# Patient Record
Sex: FEMALE | Race: WHITE
[De-identification: names, ages, dates, MRNs, and addresses within clinical notes are randomized per-mention and may not be internally consistent; named-entity substitution may affect disease eponyms.]

---

## 2017-07-15 ENCOUNTER — HEALTH MAINTENANCE LETTER (OUTPATIENT)
Age: 42
End: 2017-07-15

## 2019-11-03 ENCOUNTER — HEALTH MAINTENANCE LETTER (OUTPATIENT)
Age: 44
End: 2019-11-03

## 2020-11-16 ENCOUNTER — HEALTH MAINTENANCE LETTER (OUTPATIENT)
Age: 45
End: 2020-11-16

## 2021-01-28 ENCOUNTER — IMMUNIZATION (OUTPATIENT)
Dept: NURSING | Facility: CLINIC | Age: 46
End: 2021-01-28
Payer: COMMERCIAL

## 2021-01-28 PROCEDURE — 0001A PR COVID VAC PFIZER DIL RECON 30 MCG/0.3 ML IM: CPT

## 2021-01-28 PROCEDURE — 91300 PR COVID VAC PFIZER DIL RECON 30 MCG/0.3 ML IM: CPT

## 2021-02-07 ENCOUNTER — HEALTH MAINTENANCE LETTER (OUTPATIENT)
Age: 46
End: 2021-02-07

## 2021-02-18 ENCOUNTER — IMMUNIZATION (OUTPATIENT)
Dept: NURSING | Facility: CLINIC | Age: 46
End: 2021-02-18
Attending: INTERNAL MEDICINE
Payer: COMMERCIAL

## 2021-02-18 PROCEDURE — 0002A PR COVID VAC PFIZER DIL RECON 30 MCG/0.3 ML IM: CPT

## 2021-02-18 PROCEDURE — 91300 PR COVID VAC PFIZER DIL RECON 30 MCG/0.3 ML IM: CPT

## 2021-09-18 ENCOUNTER — HEALTH MAINTENANCE LETTER (OUTPATIENT)
Age: 46
End: 2021-09-18

## 2022-01-08 ENCOUNTER — HEALTH MAINTENANCE LETTER (OUTPATIENT)
Age: 47
End: 2022-01-08

## 2022-03-05 ENCOUNTER — HEALTH MAINTENANCE LETTER (OUTPATIENT)
Age: 47
End: 2022-03-05

## 2022-11-19 ENCOUNTER — HEALTH MAINTENANCE LETTER (OUTPATIENT)
Age: 47
End: 2022-11-19

## 2023-02-17 ENCOUNTER — OFFICE VISIT (OUTPATIENT)
Dept: PLASTIC SURGERY | Facility: CLINIC | Age: 48
End: 2023-02-17
Payer: COMMERCIAL

## 2023-02-17 DIAGNOSIS — J34.89 NASAL OBSTRUCTION: Primary | ICD-10-CM

## 2023-02-17 NOTE — LETTER
2/17/2023       RE: Lara Villavicencio  [address] [facility] [address]     Dear Colleague,    Thank you for referring your patient, Lara Villavicencio, to the [facility] at [facility]. Please see a copy of my visit note below.    Facial Plastic and Reconstructive Surgery Consultation    Referring Provider:  Dr. Quin ROBLES       HPI:   I had the pleasure of seeing Lara Villavicencio today in clinic for consultation for nasal obstruction. Lara Villavicencio is a 48 year old female who describes nasal obstruction in the setting of feeling like her throat closes up. She has been evaluated previously and found to have a straight septum but also nasal valve collapse. The nasal obstruction is noted primarily when she exerts herself.  She also has trouble breathing at night.  If she supports her nostrils with pulling on her cheek she feels significant improvement in her breathing.    She also discusses interest in addressing appearance in terms of the profile and bulbosity of the tip.     Of note Lara has a significant past medical history of hypercoagulability.  She has factor V Leiden deficiency in addition to a prothrombin deficiency and another genetic mutation.  She has had a history of a right leg DVT in 2001 in addition to pulmonary emboli twice.  She also has had an IVC filter placed.    She also discusses other options that she would like addressed from a cosmetic perspective.  She would like to address her upper lip fullness in addition to her upper eyelids.       Review Of Systems  ROS: 10 point ROS neg other than the symptoms noted above in the HPI.    Patient Active Problem List   Diagnosis     Osteopenia     Factor V Leiden mutation (H)     Lower leg DVT (deep venous thrombosis) (H)     Endometriosis     Chronic anticoagulation     Unconjugated benign bilirubinemia     HYPERLIPIDEMIA LDL GOAL <130     Pulmonary embolism in pregnancy childbirth or puerperium      "Anxiety     Past Surgical History:   Procedure Laterality Date     C DEXA INTERPRETATION, AXIAL  2007    T score lumbar -1.5, femoral neck -0.9/-0.8     C DEXA INTERPRETATION, AXIAL  2010    T score lumbar -1.2 (inc 4.3%) femoral neck -1.6/-1.8 with BMD 0.784 (dec 1.4%)     HC ENDOMETRIAL BIOPSY W/O CERVICAL DILATION  3/2010    atrophic endometrium     HC ENDOMETRIAL CRYOABLATION W US GUIDE, INCLUDES ENDOMET CURRET  3/2010    \"Her Option\"     HC HYSTEROS W PERMANENT FALLOPAIN IMPLANT  3/2010    Essure placement     HC HYSTEROSALPINGOGRAM  2010    hysterosalpingogram showed tubal blockade s/p Essure     HC US ABDOMEN COMPLETE  2009    small amount of echogenic bile sludge in the gallbladder, no stones     HC US PELVIC NON-OB, COMPLETE  2010    normal     LAPAROSCOPY,LYSIS ADHESNS      endometriosis     SURGICAL HISTORY OF -   2001    inferior vena cava  filter     ZZC  DELIVERY ONLY  2001    with re-exploration for bleeding, placental abruption , pregnancy loss     Current Outpatient Medications   Medication Sig Dispense Refill     atorvastatin (LIPITOR) 10 MG tablet Take 1 tablet (10 mg) by mouth daily 90 tablet 1     calcium carbonate-vitamin D (CALTRATE 600+D) 600-400 MG-UNIT CHEW Take 1 chew tab by mouth daily 90 tablet 3     warfarin (COUMADIN) 5 MG tablet 7.5 mg on  and  and 5 mg all the other days or as directed by the anticoagulation clinic 135 tablet 3     warfarin (COUMADIN) 7.5 MG tablet Take 1 tab by mouth  and  or as directed by ACC 30 tablet 0     Rocephin [ceftriaxone]  Social History     Socioeconomic History     Marital status:      Spouse name: Ventura     Number of children: 1     Years of education: 16     Highest education level: Not on file   Occupational History     Occupation: hygenist     Employer: OTHER     Comment: [employer]     Employer: OTHER   Tobacco Use     Smoking status: Never     Smokeless tobacco: Never   Substance " and Sexual Activity     Alcohol use: Yes     Comment: 1-2 drinks per week     Drug use: No     Sexual activity: Yes     Partners: Male     Birth control/protection: Condom, Surgical     Comment: same partner since 1994, only partner, Essure 3/2010   Other Topics Concern      Service No     Blood Transfusions Yes     Comment: with C secction/placenta previa     Caffeine Concern No     Comment: 1 soda daily     Occupational Exposure Yes     Comment: dental  hygienist     Hobby Hazards No     Sleep Concern No     Stress Concern Yes     Comment: moderate      Weight Concern No     Special Diet No     Comment: one glass milk daily     Back Care No     Exercise No     Bike Helmet Yes     Seat Belt Yes     Self-Exams Yes   Social History Narrative    No plans for further pregancy due to previous outcome of pregancy loss and chance of recurrent problems with clotting disorder in pregnancy.    12/2005: Taking on line courses for BS in dental hygiene, graduated in May 2007.        Lives with  and adopted daughter, age 11.  Has a cat and two dogs.     Social Determinants of Health     Financial Resource Strain: Not on file   Food Insecurity: Not on file   Transportation Needs: Not on file   Physical Activity: Not on file   Stress: Not on file   Social Connections: Not on file   Intimate Partner Violence: Not on file   Housing Stability: Not on file     Family History   Problem Relation Age of Onset     Blood Disease Mother         Factor V leiden, asymptomatic     Blood Disease Sister         Factor V Leiden, asymptomatic     Blood Disease Father         Factor V Leiden and prothrombin mutation, asymptomatic     Blood Disease Paternal Uncle         prothrombin mutation, asymptomatic     C.A.D. Maternal Grandfather         onset 60's     Diabetes Maternal Grandfather         type 2     Lipids Maternal Grandfather      Lipids Mother      Lipids Maternal Aunt      Lipids Maternal Uncle      C.A.D. Maternal Uncle          CABG mid 50s     C.A.D. Maternal Aunt         CABG early 50s     Depression Mother      Depression Sister         suicide attempt     Alzheimer Disease Maternal Grandmother         onset age 79     Cancer Maternal Grandmother         bladder cancer     Cancer Paternal Grandmother         melanoma     Circulatory Paternal Grandmother         DVT     Circulatory Paternal Grandfather         DVT     Osteoporosis Paternal Grandfather         rib and vertebral fractures       PE:  Alert and Oriented, Answering Questions Appropriately  Atraumatic, Normocephalic, Face Symmetric  Skin: Laws 2  Facial Nerve Intact and facial movement symmetric  EOM's, PEERL  Nasal Exam: Significant cephalic malposed position of the lower lateral cartilages with pinching in the alar groove bilaterally.  Both internal and external nasal valve support with inspiration.  Caudal and modified Tram maneuver does lead to significant improvement in nasal breathing.  Septum is straight.  Inferior turbinate is larger on the right than the left.  Notable dorsal convexity.  Thick nasal skin.  No external Deformity, no mucopurulence or polyps, no masses  Chin: Normal   Lips/Teeth/Toungue/Gums: Lips intact, Normal Dentition, Occlusion intact, Oral mucosa intact, no lesions/ulcerations/masses, Tongue mobile    PROCEDURE: diagnostic nasal endoscopy  Indication: nasal obstruction  Performed by: Nini Spears MD      Nasal Endoscopy is performed to provide a more thorough evaluation of the nasal airway than seen on standard nasal speculum exam.  Findings are as follows:   Anteriorly and posteriorly the septum is straight. The right inferior turbinate is larger than the left. The perpendicular plate is straight. The nasal valve internally is collapsed bilaterally with the caudal edge of the upper lateral cartilage in contact with the septum bilaterally.       IMPRESSION:    Nasal obstruction  Nasal valve collapse      PLAN:    Lara DAVIS  Saud presents today for discussion of nasal obstruction.  Her nasal obstruction is primarily due to the cephalic malposition of her lower lateral cartilages and the lack of support to both her external and internal nasal valves.  We discussed what definitive treatment is for that which would be an open approach lateral crural repositioning with lateral crural strut grafts.  We discussed that this is an invasive procedure of the nose and the biggest risk factor after surgery is bleeding.    In order for the surgery to be safe for the patient she would have to go off of anticoagulation.  This would pose then a significant risk postoperatively.  Most patients are not very mobile and the week after surgery and often have 2 weeks where it takes time to get back to function.  We discussed that the nasal valve could be addressed in a more minimally invasive fashion where I think her breathing could be nicely supported without the increased bleeding or thrombotic risk.  I think she would benefit from lateral wall nasal implants that I discussed with her today.  These are the Latera implants.    We discussed the potential appearance changes she would want for her nose and those would involve osteotomies in addition to bony vault changes.  I do also think these pose a significant risk for her.  I have performed elective procedures on patients with prothrombotic conditions however within that spectrum with her multiple genetic mutations her past history of 2 pulmonary emboli and the DVT I think she falls within a higher risk category and thus the benefits of surgery have to significantly outweigh the risk.    She is interested in considering the lateral nasal wall implants.  I am we will work to obtain prior authorization for this.      Photodocumentation was obtained.     I spent a total of 45 minutes in the care of patient Lara Villavicencio during today's office visit. This time includes reviewing the patient's chart and  prior history, obtaining a history, performing an examination and evaluation and counseling the patient. This time also includes ordering mediations or tests necessary in addition to communication to other member's of the patient's health care team. Time spent in documentation and care coordination is included.       Sincerely,    Nini Spears MD

## 2023-02-17 NOTE — NURSING NOTE
2D/3D photodocumentation obtained.    Will work to obtain PA for Latera.    Gave pt a cosmetic quote for Bilateral Upper Eyelid Blepharoplasty (see Media tab).    Discussed quote in great detail with pt, including the fact that the Anesthesia and Facility fees are an estimate based on time and may be subject to change.    Pt given educational materials on what to expect post-op, as well as a list of medications to avoid prior to surgery.    Sabina Brooks RN  2/17/2023 11:01 AM

## 2023-02-17 NOTE — LETTER
February 22, 2023  Re: Lara Villavicencio  1975      Thank you so much for referring Lara Villavicencio to the [facility]. I had the pleasure of visiting with Lara today.     Attached you will find a copy of my note. Please feel free to reach out to me with any questions, [phone].     Facial Plastic and Reconstructive Surgery Consultation    Referring Provider:  Dr. Quin ROBLES       HPI:   I had the pleasure of seeing Lara Villavicencio today in clinic for consultation for nasal obstruction. Lara Villavicencio is a 48 year old female who describes nasal obstruction in the setting of feeling like her throat closes up. She has been evaluated previously and found to have a straight septum but also nasal valve collapse. The nasal obstruction is noted primarily when she exerts herself.  She also has trouble breathing at night.  If she supports her nostrils with pulling on her cheek she feels significant improvement in her breathing.    She also discusses interest in addressing appearance in terms of the profile and bulbosity of the tip.     Of note Lara has a significant past medical history of hypercoagulability.  She has factor V Leiden deficiency in addition to a prothrombin deficiency and another genetic mutation.  She has had a history of a right leg DVT in 2001 in addition to pulmonary emboli twice.  She also has had an IVC filter placed.    She also discusses other options that she would like addressed from a cosmetic perspective.  She would like to address her upper lip fullness in addition to her upper eyelids.       Review Of Systems  ROS: 10 point ROS neg other than the symptoms noted above in the HPI.    Patient Active Problem List   Diagnosis     Osteopenia     Factor V Leiden mutation (H)     Lower leg DVT (deep venous thrombosis) (H)     Endometriosis     Chronic anticoagulation     Unconjugated benign bilirubinemia     HYPERLIPIDEMIA LDL GOAL <130     Pulmonary embolism in pregnancy childbirth or  "puerperium     Anxiety     Past Surgical History:   Procedure Laterality Date     C DEXA INTERPRETATION, AXIAL  2007    T score lumbar -1.5, femoral neck -0.9/-0.8     C DEXA INTERPRETATION, AXIAL  2010    T score lumbar -1.2 (inc 4.3%) femoral neck -1.6/-1.8 with BMD 0.784 (dec 1.4%)     HC ENDOMETRIAL BIOPSY W/O CERVICAL DILATION  3/2010    atrophic endometrium     HC ENDOMETRIAL CRYOABLATION W US GUIDE, INCLUDES ENDOMET CURRET  3/2010    \"Her Option\"     HC HYSTEROS W PERMANENT FALLOPAIN IMPLANT  3/2010    Essure placement     HC HYSTEROSALPINGOGRAM  2010    hysterosalpingogram showed tubal blockade s/p Essure     HC US ABDOMEN COMPLETE  2009    small amount of echogenic bile sludge in the gallbladder, no stones     HC US PELVIC NON-OB, COMPLETE  2010    normal     LAPAROSCOPY,LYSIS ADHESNS      endometriosis     SURGICAL HISTORY OF -   2001    inferior vena cava  filter     ZZC  DELIVERY ONLY  2001    with re-exploration for bleeding, placental abruption , pregnancy loss     Current Outpatient Medications   Medication Sig Dispense Refill     atorvastatin (LIPITOR) 10 MG tablet Take 1 tablet (10 mg) by mouth daily 90 tablet 1     calcium carbonate-vitamin D (CALTRATE 600+D) 600-400 MG-UNIT CHEW Take 1 chew tab by mouth daily 90 tablet 3     warfarin (COUMADIN) 5 MG tablet 7.5 mg on  and  and 5 mg all the other days or as directed by the anticoagulation clinic 135 tablet 3     warfarin (COUMADIN) 7.5 MG tablet Take 1 tab by mouth  and  or as directed by ACC 30 tablet 0     Rocephin [ceftriaxone]  Social History     Socioeconomic History     Marital status:      Spouse name: Ventura     Number of children: 1     Years of education: 16     Highest education level: Not on file   Occupational History     Occupation: hygenist     Employer: OTHER     Comment: [employer]     Employer: OTHER   Tobacco Use     Smoking status: Never     Smokeless tobacco: " Never   Substance and Sexual Activity     Alcohol use: Yes     Comment: 1-2 drinks per week     Drug use: No     Sexual activity: Yes     Partners: Male     Birth control/protection: Condom, Surgical     Comment: same partner since 1994, only partner, Essure 3/2010   Other Topics Concern      Service No     Blood Transfusions Yes     Comment: with C secction/placenta previa     Caffeine Concern No     Comment: 1 soda daily     Occupational Exposure Yes     Comment: dental  hygienist     Hobby Hazards No     Sleep Concern No     Stress Concern Yes     Comment: moderate      Weight Concern No     Special Diet No     Comment: one glass milk daily     Back Care No     Exercise No     Bike Helmet Yes     Seat Belt Yes     Self-Exams Yes   Social History Narrative    No plans for further pregancy due to previous outcome of pregancy loss and chance of recurrent problems with clotting disorder in pregnancy.    12/2005: Taking on line courses for BS in dental hygiene, graduated in May 2007.        Lives with  and adopted daughter, age 11.  Has a cat and two dogs.     Social Determinants of Health     Financial Resource Strain: Not on file   Food Insecurity: Not on file   Transportation Needs: Not on file   Physical Activity: Not on file   Stress: Not on file   Social Connections: Not on file   Intimate Partner Violence: Not on file   Housing Stability: Not on file     Family History   Problem Relation Age of Onset     Blood Disease Mother         Factor V leiden, asymptomatic     Blood Disease Sister         Factor V Leiden, asymptomatic     Blood Disease Father         Factor V Leiden and prothrombin mutation, asymptomatic     Blood Disease Paternal Uncle         prothrombin mutation, asymptomatic     C.A.D. Maternal Grandfather         onset 60's     Diabetes Maternal Grandfather         type 2     Lipids Maternal Grandfather      Lipids Mother      Lipids Maternal Aunt      Lipids Maternal Uncle      C.A.D.  Maternal Uncle         CABG mid 50s     C.A.D. Maternal Aunt         CABG early 50s     Depression Mother      Depression Sister         suicide attempt     Alzheimer Disease Maternal Grandmother         onset age 79     Cancer Maternal Grandmother         bladder cancer     Cancer Paternal Grandmother         melanoma     Circulatory Paternal Grandmother         DVT     Circulatory Paternal Grandfather         DVT     Osteoporosis Paternal Grandfather         rib and vertebral fractures       PE:  Alert and Oriented, Answering Questions Appropriately  Atraumatic, Normocephalic, Face Symmetric  Skin: Laws 2  Facial Nerve Intact and facial movement symmetric  EOM's, PEERL  Nasal Exam: Significant cephalic malposed position of the lower lateral cartilages with pinching in the alar groove bilaterally.  Both internal and external nasal valve support with inspiration.  Caudal and modified Tram maneuver does lead to significant improvement in nasal breathing.  Septum is straight.  Inferior turbinate is larger on the right than the left.  Notable dorsal convexity.  Thick nasal skin.  No external Deformity, no mucopurulence or polyps, no masses  Chin: Normal   Lips/Teeth/Toungue/Gums: Lips intact, Normal Dentition, Occlusion intact, Oral mucosa intact, no lesions/ulcerations/masses, Tongue mobile    PROCEDURE: diagnostic nasal endoscopy  Indication: nasal obstruction  Performed by: Nini Spears MD      Nasal Endoscopy is performed to provide a more thorough evaluation of the nasal airway than seen on standard nasal speculum exam.  Findings are as follows:   Anteriorly and posteriorly the septum is straight. The right inferior turbinate is larger than the left. The perpendicular plate is straight. The nasal valve internally is collapsed bilaterally with the caudal edge of the upper lateral cartilage in contact with the septum bilaterally.       IMPRESSION:    Nasal obstruction  Nasal valve  collapse      PLAN:    Lara Villavicencio presents today for discussion of nasal obstruction.  Her nasal obstruction is primarily due to the cephalic malposition of her lower lateral cartilages and the lack of support to both her external and internal nasal valves.  We discussed what definitive treatment is for that which would be an open approach lateral crural repositioning with lateral crural strut grafts.  We discussed that this is an invasive procedure of the nose and the biggest risk factor after surgery is bleeding.    In order for the surgery to be safe for the patient she would have to go off of anticoagulation.  This would pose then a significant risk postoperatively.  Most patients are not very mobile and the week after surgery and often have 2 weeks where it takes time to get back to function.  We discussed that the nasal valve could be addressed in a more minimally invasive fashion where I think her breathing could be nicely supported without the increased bleeding or thrombotic risk.  I think she would benefit from lateral wall nasal implants that I discussed with her today.  These are the Latera implants.    We discussed the potential appearance changes she would want for her nose and those would involve osteotomies in addition to bony vault changes.  I do also think these pose a significant risk for her.  I have performed elective procedures on patients with prothrombotic conditions however within that spectrum with her multiple genetic mutations her past history of 2 pulmonary emboli and the DVT I think she falls within a higher risk category and thus the benefits of surgery have to significantly outweigh the risk.    She is interested in considering the lateral nasal wall implants.  I am we will work to obtain prior authorization for this.      Photodocumentation was obtained.     I spent a total of 45 minutes in the care of patient Lara Villavicencio during today's office visit. This time includes  reviewing the patient's chart and prior history, obtaining a history, performing an examination and evaluation and counseling the patient. This time also includes ordering mediations or tests necessary in addition to communication to other member's of the patient's health care team. Time spent in documentation and care coordination is included.         Your trust in our practice and care is much appreciated.    Sincerely,  Nini Spears MD

## 2023-02-17 NOTE — PROGRESS NOTES
Facial Plastic and Reconstructive Surgery Consultation    Referring Provider:  Dr. Quin ROBLES       HPI:   I had the pleasure of seeing Lara Villavicencio today in clinic for consultation for nasal obstruction. Lara Villavicencio is a 48 year old female who describes nasal obstruction in the setting of feeling like her throat closes up. She has been evaluated previously and found to have a straight septum but also nasal valve collapse. The nasal obstruction is noted primarily when she exerts herself.  She also has trouble breathing at night.  If she supports her nostrils with pulling on her cheek she feels significant improvement in her breathing.    She also discusses interest in addressing appearance in terms of the profile and bulbosity of the tip.     Of note Lara has a significant past medical history of hypercoagulability.  She has factor V Leiden deficiency in addition to a prothrombin deficiency and another genetic mutation.  She has had a history of a right leg DVT in 2001 in addition to pulmonary emboli twice.  She also has had an IVC filter placed.    She also discusses other options that she would like addressed from a cosmetic perspective.  She would like to address her upper lip fullness in addition to her upper eyelids.       Review Of Systems  ROS: 10 point ROS neg other than the symptoms noted above in the HPI.    Patient Active Problem List   Diagnosis     Osteopenia     Factor V Leiden mutation (H)     Lower leg DVT (deep venous thrombosis) (H)     Endometriosis     Chronic anticoagulation     Unconjugated benign bilirubinemia     HYPERLIPIDEMIA LDL GOAL <130     Pulmonary embolism in pregnancy childbirth or puerperium     Anxiety     Past Surgical History:   Procedure Laterality Date     C DEXA INTERPRETATION, AXIAL  9/2007    T score lumbar -1.5, femoral neck -0.9/-0.8     C DEXA INTERPRETATION, AXIAL  6/2010    T score lumbar -1.2 (inc 4.3%) femoral neck -1.6/-1.8 with BMD 0.784 (dec 1.4%)     HC  "ENDOMETRIAL BIOPSY W/O CERVICAL DILATION  3/2010    atrophic endometrium     HC ENDOMETRIAL CRYOABLATION W US GUIDE, INCLUDES ENDOMET CURRET  3/2010    \"Her Option\"     HC HYSTEROS W PERMANENT FALLOPAIN IMPLANT  3/2010    Essure placement     HC HYSTEROSALPINGOGRAM  2010    hysterosalpingogram showed tubal blockade s/p Essure     HC US ABDOMEN COMPLETE  2009    small amount of echogenic bile sludge in the gallbladder, no stones     HC US PELVIC NON-OB, COMPLETE  2010    normal     LAPAROSCOPY,LYSIS ADHESNS      endometriosis     SURGICAL HISTORY OF -   2001    inferior vena cava  filter     ZZC  DELIVERY ONLY  2001    with re-exploration for bleeding, placental abruption , pregnancy loss     Current Outpatient Medications   Medication Sig Dispense Refill     atorvastatin (LIPITOR) 10 MG tablet Take 1 tablet (10 mg) by mouth daily 90 tablet 1     calcium carbonate-vitamin D (CALTRATE 600+D) 600-400 MG-UNIT CHEW Take 1 chew tab by mouth daily 90 tablet 3     warfarin (COUMADIN) 5 MG tablet 7.5 mg on  and  and 5 mg all the other days or as directed by the anticoagulation clinic 135 tablet 3     warfarin (COUMADIN) 7.5 MG tablet Take 1 tab by mouth  and  or as directed by [facility] 30 tablet 0     Rocephin [ceftriaxone]  Social History     Socioeconomic History     Marital status:      Spouse name: Ventura     Number of children: 1     Years of education: 16     Highest education level: Not on file   Occupational History     Occupation: hygenist     Employer: OTHER     Comment: [employer]     Employer: OTHER   Tobacco Use     Smoking status: Never     Smokeless tobacco: Never   Substance and Sexual Activity     Alcohol use: Yes     Comment: 1-2 drinks per week     Drug use: No     Sexual activity: Yes     Partners: Male     Birth control/protection: Condom, Surgical     Comment: same partner since , only partner, Essure 3/2010   Other Topics Concern      " Service No     Blood Transfusions Yes     Comment: with C secction/placenta previa     Caffeine Concern No     Comment: 1 soda daily     Occupational Exposure Yes     Comment: dental  hygienist     Hobby Hazards No     Sleep Concern No     Stress Concern Yes     Comment: moderate      Weight Concern No     Special Diet No     Comment: one glass milk daily     Back Care No     Exercise No     Bike Helmet Yes     Seat Belt Yes     Self-Exams Yes   Social History Narrative    No plans for further pregancy due to previous outcome of pregancy loss and chance of recurrent problems with clotting disorder in pregnancy.    12/2005: Taking on line courses for BS in dental hygiene, graduated in May 2007.        Lives with  and adopted daughter, age 11.  Has a cat and two dogs.     Social Determinants of Health     Financial Resource Strain: Not on file   Food Insecurity: Not on file   Transportation Needs: Not on file   Physical Activity: Not on file   Stress: Not on file   Social Connections: Not on file   Intimate Partner Violence: Not on file   Housing Stability: Not on file     Family History   Problem Relation Age of Onset     Blood Disease Mother         Factor V leiden, asymptomatic     Blood Disease Sister         Factor V Leiden, asymptomatic     Blood Disease Father         Factor V Leiden and prothrombin mutation, asymptomatic     Blood Disease Paternal Uncle         prothrombin mutation, asymptomatic     C.A.D. Maternal Grandfather         onset 60's     Diabetes Maternal Grandfather         type 2     Lipids Maternal Grandfather      Lipids Mother      Lipids Maternal Aunt      Lipids Maternal Uncle      C.A.D. Maternal Uncle         CABG mid 50s     C.A.D. Maternal Aunt         CABG early 50s     Depression Mother      Depression Sister         suicide attempt     Alzheimer Disease Maternal Grandmother         onset age 79     Cancer Maternal Grandmother         bladder cancer     Cancer Paternal  Grandmother         melanoma     Circulatory Paternal Grandmother         DVT     Circulatory Paternal Grandfather         DVT     Osteoporosis Paternal Grandfather         rib and vertebral fractures       PE:  Alert and Oriented, Answering Questions Appropriately  Atraumatic, Normocephalic, Face Symmetric  Skin: Laws 2  Facial Nerve Intact and facial movement symmetric  EOM's, PEERL  Nasal Exam: Significant cephalic malposed position of the lower lateral cartilages with pinching in the alar groove bilaterally.  Both internal and external nasal valve support with inspiration.  Caudal and modified Tram maneuver does lead to significant improvement in nasal breathing.  Septum is straight.  Inferior turbinate is larger on the right than the left.  Notable dorsal convexity.  Thick nasal skin.  No external Deformity, no mucopurulence or polyps, no masses  Chin: Normal   Lips/Teeth/Toungue/Gums: Lips intact, Normal Dentition, Occlusion intact, Oral mucosa intact, no lesions/ulcerations/masses, Tongue mobile    PROCEDURE: diagnostic nasal endoscopy  Indication: nasal obstruction  Performed by: Nini Spears MD      Nasal Endoscopy is performed to provide a more thorough evaluation of the nasal airway than seen on standard nasal speculum exam.  Findings are as follows:   Anteriorly and posteriorly the septum is straight. The right inferior turbinate is larger than the left. The perpendicular plate is straight. The nasal valve internally is collapsed bilaterally with the caudal edge of the upper lateral cartilage in contact with the septum bilaterally.       IMPRESSION:    Nasal obstruction  Nasal valve collapse      PLAN:    Lara Villavicencio presents today for discussion of nasal obstruction.  Her nasal obstruction is primarily due to the cephalic malposition of her lower lateral cartilages and the lack of support to both her external and internal nasal valves.  We discussed what definitive treatment is for  that which would be an open approach lateral crural repositioning with lateral crural strut grafts.  We discussed that this is an invasive procedure of the nose and the biggest risk factor after surgery is bleeding.    In order for the surgery to be safe for the patient she would have to go off of anticoagulation.  This would pose then a significant risk postoperatively.  Most patients are not very mobile and the week after surgery and often have 2 weeks where it takes time to get back to function.  We discussed that the nasal valve could be addressed in a more minimally invasive fashion where I think her breathing could be nicely supported without the increased bleeding or thrombotic risk.  I think she would benefit from lateral wall nasal implants that I discussed with her today.  These are the Latera implants.    We discussed the potential appearance changes she would want for her nose and those would involve osteotomies in addition to bony vault changes.  I do also think these pose a significant risk for her.  I have performed elective procedures on patients with prothrombotic conditions however within that spectrum with her multiple genetic mutations her past history of 2 pulmonary emboli and the DVT I think she falls within a higher risk category and thus the benefits of surgery have to significantly outweigh the risk.    She is interested in considering the lateral nasal wall implants.  I am we will work to obtain prior authorization for this.      Photodocumentation was obtained.     I spent a total of 45 minutes in the care of patient Lara Villavicencio during today's office visit. This time includes reviewing the patient's chart and prior history, obtaining a history, performing an examination and evaluation and counseling the patient. This time also includes ordering mediations or tests necessary in addition to communication to other member's of the patient's health care team. Time spent in documentation and  care coordination is included.

## 2023-02-23 DIAGNOSIS — J34.829 NASAL VALVE COLLAPSE: ICD-10-CM

## 2023-02-23 DIAGNOSIS — J34.89 NASAL OBSTRUCTION: Primary | ICD-10-CM

## 2023-02-23 RX ORDER — WARFARIN SODIUM 5 MG/1
5 TABLET ORAL DAILY
COMMUNITY

## 2023-02-23 RX ORDER — CLINDAMYCIN PHOSPHATE 900 MG/50ML
900 INJECTION, SOLUTION INTRAVENOUS
Status: CANCELLED | OUTPATIENT
Start: 2023-02-23

## 2023-02-23 RX ORDER — CLINDAMYCIN PHOSPHATE 900 MG/50ML
900 INJECTION, SOLUTION INTRAVENOUS SEE ADMIN INSTRUCTIONS
Status: CANCELLED | OUTPATIENT
Start: 2023-02-23

## 2023-02-23 RX ORDER — WARFARIN SODIUM 7.5 MG/1
7.5 TABLET ORAL DAILY
COMMUNITY

## 2023-02-28 PROBLEM — J34.89 NASAL OBSTRUCTION: Status: ACTIVE | Noted: 2023-02-28

## 2023-02-28 PROBLEM — J34.829 NASAL VALVE COLLAPSE: Status: ACTIVE | Noted: 2023-02-28

## 2023-04-09 ENCOUNTER — HEALTH MAINTENANCE LETTER (OUTPATIENT)
Age: 48
End: 2023-04-09

## 2023-09-10 ENCOUNTER — HEALTH MAINTENANCE LETTER (OUTPATIENT)
Age: 48
End: 2023-09-10

## 2024-11-03 ENCOUNTER — HEALTH MAINTENANCE LETTER (OUTPATIENT)
Age: 49
End: 2024-11-03

## 2025-04-19 ENCOUNTER — HEALTH MAINTENANCE LETTER (OUTPATIENT)
Age: 50
End: 2025-04-19